# Patient Record
Sex: FEMALE | Race: WHITE | URBAN - METROPOLITAN AREA
[De-identification: names, ages, dates, MRNs, and addresses within clinical notes are randomized per-mention and may not be internally consistent; named-entity substitution may affect disease eponyms.]

---

## 2017-03-21 ENCOUNTER — THERAPY VISIT (OUTPATIENT)
Dept: PHYSICAL THERAPY | Facility: CLINIC | Age: 82
End: 2017-03-21
Payer: COMMERCIAL

## 2017-03-21 DIAGNOSIS — M75.41 IMPINGEMENT SYNDROME, SHOULDER, RIGHT: ICD-10-CM

## 2017-03-21 DIAGNOSIS — M54.50 CHRONIC BILATERAL LOW BACK PAIN WITHOUT SCIATICA: Primary | ICD-10-CM

## 2017-03-21 DIAGNOSIS — G89.29 CHRONIC BILATERAL LOW BACK PAIN WITHOUT SCIATICA: Primary | ICD-10-CM

## 2017-03-21 PROCEDURE — 97161 PT EVAL LOW COMPLEX 20 MIN: CPT | Mod: GP | Performed by: PHYSICAL THERAPIST

## 2017-03-21 PROCEDURE — 97110 THERAPEUTIC EXERCISES: CPT | Mod: GP | Performed by: PHYSICAL THERAPIST

## 2017-03-21 PROCEDURE — 97112 NEUROMUSCULAR REEDUCATION: CPT | Mod: GP | Performed by: PHYSICAL THERAPIST

## 2017-03-21 NOTE — LETTER
"[facility] ATHLETIC [facility] PHYSICAL THERAPY  [address]  [facility] [address]  [phone]    2017    Re: Karyna Beal   :   1932  MRN:  [id number]   REFERRING PHYSICIAN:   Francheska WALTERS [facility] ATHLETIC [facility] PHYSICAL [facility]    Date of Initial Evaluation: 2017  Visits:  Rxs Used: 1  Reason for Referral:     Chronic bilateral low back pain without sciatica  Impingement syndrome, shoulder, right    EVALUATION SUMMARY    [facility] Initial Evaluation    Subjective:  Karyna Beal is a 85 year old female with a right shoulder condition.  This is a chronic condition  3/14/17 saw MD for R shoulder pain that has been off and on for many years, but constant pain is new since 2017.  Chiropractor has treated this sometimes with acupuncture, TENS and neck massage..    Patient reports pain:  Anterior, posterior and upper trap (cap of shoulder).  Radiates to:  Cervical (shoulder blades, sometimes R neck pain).  Pain is described as aching and is intermittent and reported as 5/10.  Associated symptoms:  Loss of strength. Pain is worse during the day.  Symptoms are exacerbated by lying on extremity (vacuuming painful, snow shoveling) and relieved by heat (\"pain no more\").  Since onset symptoms are gradually worsening.  Special testing: none.                Karyna Beal is a 85 year old female with a lumbar condition. This is a chronic condition  3/14/17 saw MD for low back pain that has been chronic for 20+ years, no specific initial injury but does remember significant pain after sitting in airplane for prolonged position.  Symptoms worsening since 2017 for unknown reasons.  Has seen chiropractor in past and currently goes about every 6 weeks (acupuncture, TENs, massage, manipulations etc). Patient reports pain:  Lumbar spine left, central lumbar spine and lumbar spine right (L>R). "  Radiates to:  Gluteals left and gluteals right (lateral hips).  Pain is described as aching and is intermittent and reported as 7/10.  Associated symptoms:  Loss of motion/stiffness. Pain is worse in the A.M..  Symptoms are exacerbated by walking and sitting (has difficulty getting out of bed and straightening up, also difficult/pain getting up from chair, goal for bending for gardening, walking 10 blocks) and relieved by other (hot shower, morning exercises).  Since onset symptoms are gradually worsening.  Special tests:  X-ray (very little space between L3-4-5, arthritis, scoliosis).  Previous treatment includes chiropractic.  There was mild improvement following previous treatment.  General health as reported by patient is good.  Pertinent medical history includes:  Asthma, osteoarthritis and osteoporosis (R   Re: Karyna Beal   :   1932    hand dominant).  Medical allergies: yes (morphine).  Other surgeries include:  Orthopedic surgery and cancer surgery (B TKA, cancer lumpectomy).  Current medications:  Pain medication (tylenol, med for asthma).  Current occupation is Retired.    Barriers include:  Lives alone (home and yard work).  Red flags:  Pain at rest/night.                  Objective:  Standing Alignment:    Cervical/Thoracic:  Forward head  Shoulder/UE:  Rounded shoulders  Lumbar deviations alignment: scoliosis.       Lumbar/SI Evaluation  ROM:    AROM Lumbar:   Flexion:          No loss, hands flat to floor  Ext:                    Min loss   Side Bend:        Left:     Right:   Rotation:           Left:     Right:   Side Glide:        Left:  No loss    Right:  No loss  Functional Tests:  Core strength and proprioception lumbar: B squat mild loss of control with excessive anterior knee excursion.    [facility] Lumbar Evaluation  Posture:  Sitting: fair  Standing: fair  Lordosis: WNL  Lateral Shift: no  Correction of Posture: better  Other Observations: no pain with sit to stand transfer  after performing slouch/over-correct  Test Movements:  FIS: During: no effect  After: no effect  Mechanical Response: no effect  EIS: During: no effect  After: no effect    Repeat EIS: During: decreases  After: better  Mechanical Response: no effect  Conclusion lumbar: full assessment not completed into flexion due to osteoporosis, however after in flexed position extension decreases pain.  Principle of Treatment:  Posture Correction: slouch/overcorrect performed for midrange motion then no pain getting up from sitting. Educate keep neutral spine, use of lumbar roll, educate avoiding flexion to prevent osteoporotic fractures etc  Extension: EIS 10x 3-6x/day or as needed for relief    Assessment/Plan:    Patient is a 85 year old female with lumbar and right side shoulder complaints.    Patient has the following significant findings with corresponding treatment plan.                Diagnosis 1:  Chronic LBP without sciatica  Re: Karyna Beal   :   1932    Pain -  self management, education, directional preference exercise and home program  Decreased ROM/flexibility - manual therapy, therapeutic exercise and home program  Decreased strength - therapeutic exercise, therapeutic activities and home program  Decreased function - therapeutic activities and home program  Impaired posture - neuro re-education and home program  Diagnosis 2:  R shoulder impingement  Will perform objective assessment on subsequent visit due to time constraint    Therapy Evaluation Codes:   1) History comprised of:   Personal factors that impact the plan of care:      Time since onset of symptoms.    Comorbidity factors that impact the plan of care are:      Osteoarthritis, Weakness and osteoporosis.     Medications impacting care: Pain.  2) Examination of Body Systems comprised of:   Body structures and functions that impact the plan of care:      Cervical spine, Lumbar spine and Shoulder.   Activity limitations that impact the plan  of care are:      Bending, Lifting, Sitting, Squatting/kneeling, Standing, Walking and gardening, vacuuming.  3) Clinical presentation characteristics are:   Stable/Uncomplicated.  4) Decision-Making    Low complexity using standardized patient assessment instrument and/or measureable assessment of functional outcome.  Cumulative Therapy Evaluation is: Low complexity.    Previous and current functional limitations:  (See Goal Flow Sheet for this information)    Short term and Long term goals: (See Goal Flow Sheet for this information)     Communication ability:  Patient appears to be able to clearly communicate and understand verbal and written communication and follow directions correctly.  Treatment Explanation - The following has been discussed with the patient:   RX ordered/plan of care  Anticipated outcomes  Possible risks and side effects  This patient would benefit from PT intervention to resume normal activities.   Rehab potential is good.    Frequency:  1 X week, once daily  Duration:  for 6 weeks  Discharge Plan:  Achieve all LTG.  Independent in home treatment program.  Reach maximal therapeutic benefit.        Re: Karyna Beal   :   1932    Thank you for your referral.    INQUIRIES  Therapist: Matilda Mares DPT  [facility]ERAP[facility]  [address] [address]  Phone: [phone]  Fax: [phone]

## 2017-03-21 NOTE — MR AVS SNAPSHOT
"              After Visit Summary   3/21/2017    Karyna Beal    MRN: [id number]           Patient Information     Date Of Birth          1/22/1932        Visit Information        Provider Department      3/21/2017 12:10 PM Matilda Mares PT [facility] Physical Therapy        Today's Diagnoses     Chronic bilateral low back pain without sciatica    -  1    Impingement syndrome, shoulder, right           Follow-ups after your visit        Your next 10 appointments already scheduled     Mar 28, 2017 12:10 PM CDT   [facility] Spine with Matilda Mares PT   [facility]    [address]  [place] [id number] [phone]              Who to contact     If you have questions or need follow up information about today's clinic visit or your schedule please contact [facility] ATHLETIC [facility] PHYSICAL [facility] directly at [phone].  Normal or non-critical lab and imaging results will be communicated to you by [employer], letter or phone within 4 business days after the clinic has received the results. If you do not hear from us within 7 days, please contact the clinic through C[employer] or phone. If you have a critical or abnormal lab result, we will notify you by phone as soon as possible.  Submit refill requests through [employer] or call your pharmacy and they will forward the refill request to us. Please allow 3 business days for your refill to be completed.          Additional Information About Your Visit        [employer] Information     [employer] lets you send messages to your doctor, view your test results, renew your prescriptions, schedule appointments and more. To sign up, go to [email or web address] . Click on \"Log in\" on the left side of the screen, which will take you to the Welcome page. Then click on \"Sign up Now\" on the right side of the page.     You will be " asked to enter the access code listed below, as well as some personal information. Please follow the directions to create your username and password.     Your access code is: [email or web address]  Expires: 2017  3:10 PM     Your access code will  in 90 days. If you need help or a new code, please call your [place] clinic or [phone].        Care EveryWhere ID     This is your Care EveryWhere ID. This could be used by other organizations to access your [facility] medical records  [id number]         Blood Pressure from Last 3 Encounters:   No data found for BP    Weight from Last 3 Encounters:   No data found for Wt              We Performed the Following     HC PT EVAL, LOW COMPLEXITY     SYDNI INITIAL EVAL REPORT     NEUROMUSCULAR RE-EDUCATION     THERAPEUTIC EXERCISES        Primary Care Provider    None Specified       No primary provider on file.        Thank you!     Thank you for choosing [place] FOR ATHLETIC MEDICINE [facility] PHYSICAL THERAPY  for your care. Our goal is always to provide you with excellent care. Hearing back from our patients is one way we can continue to improve our services. Please take a few minutes to complete the written survey that you may receive in the mail after your visit with us. Thank you!             Your Updated Medication List - Protect others around you: Learn how to safely use, store and throw away your medicines at www.disposemymeds.org.      Notice  As of 3/21/2017  3:10 PM    You have not been prescribed any medications.

## 2017-03-28 ENCOUNTER — THERAPY VISIT (OUTPATIENT)
Dept: PHYSICAL THERAPY | Facility: CLINIC | Age: 82
End: 2017-03-28
Payer: COMMERCIAL

## 2017-03-28 DIAGNOSIS — M75.41 IMPINGEMENT SYNDROME, SHOULDER, RIGHT: ICD-10-CM

## 2017-03-28 DIAGNOSIS — M54.50 CHRONIC BILATERAL LOW BACK PAIN WITHOUT SCIATICA: ICD-10-CM

## 2017-03-28 DIAGNOSIS — G89.29 CHRONIC BILATERAL LOW BACK PAIN WITHOUT SCIATICA: ICD-10-CM

## 2017-03-28 PROCEDURE — 97112 NEUROMUSCULAR REEDUCATION: CPT | Mod: GP | Performed by: PHYSICAL THERAPIST

## 2017-03-28 PROCEDURE — 97110 THERAPEUTIC EXERCISES: CPT | Mod: GP | Performed by: PHYSICAL THERAPIST

## 2017-04-11 ENCOUNTER — THERAPY VISIT (OUTPATIENT)
Dept: PHYSICAL THERAPY | Facility: CLINIC | Age: 82
End: 2017-04-11
Payer: COMMERCIAL

## 2017-04-11 DIAGNOSIS — M75.41 IMPINGEMENT SYNDROME, SHOULDER, RIGHT: ICD-10-CM

## 2017-04-11 DIAGNOSIS — G89.29 CHRONIC BILATERAL LOW BACK PAIN WITHOUT SCIATICA: ICD-10-CM

## 2017-04-11 DIAGNOSIS — M54.50 CHRONIC BILATERAL LOW BACK PAIN WITHOUT SCIATICA: ICD-10-CM

## 2017-04-11 PROCEDURE — 97112 NEUROMUSCULAR REEDUCATION: CPT | Mod: GP | Performed by: PHYSICAL THERAPIST

## 2017-04-11 PROCEDURE — 97110 THERAPEUTIC EXERCISES: CPT | Mod: GP | Performed by: PHYSICAL THERAPIST

## 2017-04-11 NOTE — MR AVS SNAPSHOT
"              After Visit Summary   4/11/2017    Karyna Beal    MRN: [id number]           Patient Information     Date Of Birth          1/22/1932        Visit Information        Provider Department      4/11/2017 12:10 PM Matilda Mares PT [facility] Physical Therapy        Today's Diagnoses     Chronic bilateral low back pain without sciatica        Impingement syndrome, shoulder, right           Follow-ups after your visit        Your next 10 appointments already scheduled     Apr 25, 2017 12:10 PM CDT   [facility] Spine with Matilda Mares PT   [facility]    [address] [address] [phone]              Who to contact     If you have questions or need follow up information about today's clinic visit or your schedule please contact [facility] ATHLETIC [facility] PHYSICAL [facility] directly at [phone].  Normal or non-critical lab and imaging results will be communicated to you by [employer], letter or phone within 4 business days after the clinic has received the results. If you do not hear from us within 7 days, please contact the clinic through [employer] or phone. If you have a critical or abnormal lab result, we will notify you by phone as soon as possible.  Submit refill requests through Daojia or call your pharmacy and they will forward the refill request to us. Please allow 3 business days for your refill to be completed.          Additional Information About Your Visit        [employer]ia Information     Daojia lets you send messages to your doctor, view your test results, renew your prescriptions, schedule appointments and more. To sign up, go to [email or web address] . Click on \"Log in\" on the left side of the screen, which will take you to the Welcome page. Then click on \"Sign up Now\" on the right side of the page.     You will be asked " to enter the access code listed below, as well as some personal information. Please follow the directions to create your username and password.     Your access code is: [email or web address]  Expires: 2017  3:10 PM     Your access code will  in 90 days. If you need help or a new code, please call your [place] clinic or [phone].        Care EveryWhere ID     This is your Care EveryWhere ID. This could be used by other organizations to access your [facility] medical records  [id number]         Blood Pressure from Last 3 Encounters:   No data found for BP    Weight from Last 3 Encounters:   No data found for Wt              We Performed the Following     NEUROMUSCULAR RE-EDUCATION     THERAPEUTIC EXERCISES        Primary Care Provider    None Specified       No primary provider on file.        Thank you!     Thank you for choosing [place] FOR ATHLETIC MEDICINE [facility] PHYSICAL THERAPY  for your care. Our goal is always to provide you with excellent care. Hearing back from our patients is one way we can continue to improve our services. Please take a few minutes to complete the written survey that you may receive in the mail after your visit with us. Thank you!             Your Updated Medication List - Protect others around you: Learn how to safely use, store and throw away your medicines at www.disposemymeds.org.      Notice  As of 2017  1:17 PM    You have not been prescribed any medications.

## 2017-08-10 PROBLEM — M75.41 IMPINGEMENT SYNDROME, SHOULDER, RIGHT: Status: RESOLVED | Noted: 2017-03-21 | Resolved: 2017-08-10

## 2017-08-10 PROBLEM — M54.50 CHRONIC BILATERAL LOW BACK PAIN WITHOUT SCIATICA: Status: RESOLVED | Noted: 2017-03-21 | Resolved: 2017-08-10

## 2017-08-10 PROBLEM — G89.29 CHRONIC BILATERAL LOW BACK PAIN WITHOUT SCIATICA: Status: RESOLVED | Noted: 2017-03-21 | Resolved: 2017-08-10

## 2017-08-10 NOTE — PROGRESS NOTES
Subjective:    HPI                    Objective:    System    Physical Exam    General     ROS    Assessment/Plan:      DISCHARGE REPORT    Progress reporting period is from 3/21/17 to 4/11/17, 3 visits.       SUBJECTIVE  Subjective changes noted by patient:  Had chiropractic care, acupuncture and massage yesterday and today is feeling very good.  Did yard work over the weekend and pain was 7/10 after yardwork.  Stretching EIS is does feel good, MARIAM still helpful as well.  Overall is feeling much improved.    Current Pain level: 0/10.     Initial Pain level: 8/10 (8/10 LB, 5/10 shoulder).   Changes in function:  Yes (See Goal flowsheet attached for changes in current functional level)  Adverse reaction to treatment or activity: None    OBJECTIVE  Changes noted in objective findings:  Yes, however the patient has failed to return to therapy so current objective findings are unknown. The objective findings below are from DOS 4/11/17.  AROM R shoulder flexion 148.    MMT hip ABD L 3+/5, R 4-/5, hip ext B 4-/5.    Added 2 new exercises and patient reports that's enough for now.     ASSESSMENT/PLAN  Updated problem list and treatment plan: Diagnosis 1:  Chronic LBP without sciatica   Diagnosis 2:  R shoulder impingement     STG/LTGs have been met or progress has been made towards goals:  Yes (See Goal flow sheet completed today.)  Assessment of Progress: The patient's condition is improving.  The patient's condition has potential to improve.  The patient has not returned to therapy. Current status is unknown.    Recommendations:  This patient is ready to be discharged from therapy and continue their home treatment program as did not return to therapy after 4/11/17.    Please refer to the daily flowsheet for treatment today, total treatment time and time spent performing 1:1 timed codes.

## 2020-07-13 ENCOUNTER — APPOINTMENT (OUTPATIENT)
Age: 85
Setting detail: DERMATOLOGY
End: 2020-07-13

## 2020-07-13 VITALS — RESPIRATION RATE: 16 BRPM | HEIGHT: 63.5 IN | WEIGHT: 135 LBS

## 2020-07-13 DIAGNOSIS — L81.4 OTHER MELANIN HYPERPIGMENTATION: ICD-10-CM

## 2020-07-13 PROCEDURE — OTHER COUNSELING: OTHER

## 2020-07-13 PROCEDURE — 99213 OFFICE O/P EST LOW 20 MIN: CPT

## 2020-07-13 ASSESSMENT — LOCATION SIMPLE DESCRIPTION DERM
LOCATION SIMPLE: LEFT HAND
LOCATION SIMPLE: RIGHT HAND

## 2020-07-13 ASSESSMENT — LOCATION DETAILED DESCRIPTION DERM
LOCATION DETAILED: LEFT ULNAR DORSAL HAND
LOCATION DETAILED: RIGHT RADIAL DORSAL HAND

## 2020-07-13 ASSESSMENT — LOCATION ZONE DERM: LOCATION ZONE: HAND

## 2020-07-20 ENCOUNTER — APPOINTMENT (OUTPATIENT)
Age: 85
Setting detail: DERMATOLOGY
End: 2020-07-20

## 2020-07-20 DIAGNOSIS — Z41.9 ENCOUNTER FOR PROCEDURE FOR PURPOSES OTHER THAN REMEDYING HEALTH STATE, UNSPECIFIED: ICD-10-CM

## 2020-07-20 PROCEDURE — OTHER JUVEDERM VOLLURE XC INJECTION: OTHER

## 2020-07-20 NOTE — PROCEDURE: JUVEDERM VOLLURE XC INJECTION
Procedural Text: - All areas were cleansed with chlorhexidine wipes prior to injection.\\n- Product was aspirated with syringe prior to each injection to reduce risk of intravascular injection.\\n- Patient was instructed to call the clinic immediately should any pain develop other than the expected tenderness at injection sites.\\n- Advised to expect swelling which can take 1-2 weeks to resolve completely. As a result any necessary touch-ups will want to be made no sooner than 2 weeks after injection.

## 2020-07-20 NOTE — HPI: COSMETIC (FILLERS)
Have You Had Fillers Before?: has not had fillers
Additional History: Patient is here for first ever filler appointment. She reports taking blood thinners. Denies lidocaine allergy.

## 2020-07-20 NOTE — PROCEDURE: JUVEDERM VOLLURE XC INJECTION
Consent: - Verbal and written consent obtained after discussion of risks.\\n- Risks include but not limited to bruising, bleeding, asymmetry, infection, allergic reaction, palpation of product, lumps, scar formation, Manish effect, incomplete augmentation, temporary nature, procedural pain, ulceration, and necrosis of skin.

## 2020-07-20 NOTE — PROCEDURE: JUVEDERM VOLLURE XC INJECTION
Post-Care Instructions: - Patient was instructed to apply ice to reduce swelling. Ice should not be appleid directly to the skin.\\n\\n(2 Vollure 1.0cc syringes)\\n\\nPrice: $1500

## 2020-08-03 ENCOUNTER — APPOINTMENT (OUTPATIENT)
Dept: URBAN - METROPOLITAN AREA CLINIC 254 | Age: 85
Setting detail: DERMATOLOGY
End: 2020-08-03

## 2020-08-03 DIAGNOSIS — Z41.9 ENCOUNTER FOR PROCEDURE FOR PURPOSES OTHER THAN REMEDYING HEALTH STATE, UNSPECIFIED: ICD-10-CM

## 2020-08-03 PROCEDURE — OTHER ADDITIONAL NOTES: OTHER

## 2020-08-03 NOTE — PROCEDURE: ADDITIONAL NOTES
Detail Level: Detailed
Additional Notes: Discussed adding a syringe of volbella to be injected superficially in perioral lines and marionette deeper lines. However advised these lines will still be present - we are going to soften these lines. Recommended scheduling 1t minute appointment and coming 45 minutes early to request topical numbing. Patient expressed understanding. Discussed 0.55ml vs 1.0ml syringe. Will likely recommend 0.55ml.

## 2020-08-03 NOTE — HPI: COSMETIC (FILLERS)
Additional History: Had 2 syringes vollure injected 2 weeks ago. She reports that she was happy when things were swollen, and a little disappointed with the marionette lines once the swelling resolved. She is happy with the nasolabial folds.

## 2020-08-24 ENCOUNTER — APPOINTMENT (OUTPATIENT)
Dept: URBAN - METROPOLITAN AREA CLINIC 254 | Age: 85
Setting detail: DERMATOLOGY
End: 2020-08-24

## 2020-08-24 ENCOUNTER — APPOINTMENT (OUTPATIENT)
Dept: URBAN - METROPOLITAN AREA CLINIC 252 | Age: 85
Setting detail: DERMATOLOGY
End: 2020-08-25

## 2020-08-24 ENCOUNTER — RX ONLY (RX ONLY)
Age: 85
End: 2020-08-24

## 2020-08-24 VITALS — RESPIRATION RATE: 15 BRPM | HEIGHT: 69 IN | WEIGHT: 150 LBS

## 2020-08-24 DIAGNOSIS — L08.89 OTHER SPECIFIED LOCAL INFECTIONS OF THE SKIN AND SUBCUTANEOUS TISSUE: ICD-10-CM

## 2020-08-24 DIAGNOSIS — Z41.9 ENCOUNTER FOR PROCEDURE FOR PURPOSES OTHER THAN REMEDYING HEALTH STATE, UNSPECIFIED: ICD-10-CM

## 2020-08-24 PROCEDURE — OTHER PATIENT SPECIFIC COUNSELING: OTHER

## 2020-08-24 PROCEDURE — 99213 OFFICE O/P EST LOW 20 MIN: CPT

## 2020-08-24 PROCEDURE — OTHER PRESCRIPTION: OTHER

## 2020-08-24 PROCEDURE — OTHER JUVEDERM VOLBELLA INJECTION: OTHER

## 2020-08-24 RX ORDER — DOXYCYCLINE 100 MG/1
100MG CAPSULE ORAL BID
Qty: 20 | Refills: 0 | Status: ERX

## 2020-08-24 RX ORDER — DOXYCYCLINE 100 MG/1
100MG CAPSULE ORAL BID
Qty: 20 | Refills: 0 | Status: ERX | COMMUNITY
Start: 2020-08-24

## 2020-08-24 ASSESSMENT — LOCATION ZONE DERM: LOCATION ZONE: LEG

## 2020-08-24 ASSESSMENT — LOCATION DETAILED DESCRIPTION DERM: LOCATION DETAILED: LEFT DISTAL PRETIBIAL REGION

## 2020-08-24 ASSESSMENT — LOCATION SIMPLE DESCRIPTION DERM: LOCATION SIMPLE: LEFT PRETIBIAL REGION

## 2020-08-24 NOTE — PROCEDURE: JUVEDERM VOLBELLA INJECTION
Post-Care Instructions: - Patient was instructed to apply ice to reduce swelling. Ice should not be appleid directly to the skin.\\n\\n(1 Vobella 0.55cc syringe)\\n\\nPrice: $550

## 2020-08-24 NOTE — PROCEDURE: JUVEDERM VOLBELLA INJECTION
Price (Use Numbers Only, No Special Characters Or $): 413 Price (Use Numbers Only, No Special Characters Or $): 417

## 2020-08-24 NOTE — HPI: SKIN LESION
How Severe Is Your Skin Lesion?: mild
Is This A New Presentation, Or A Follow-Up?: Skin Lesion
Additional History: Patient reports skin laceration 6 weeks ago and had a fever last week that has now resolved. Also has been tender this whole time. No calf tenderness. Swelling began in the last week or so. Laceration appears to be healing, but due to tenderness and mild redness, recommended empiric doxycycline. Dvt js not clinically suspected today. However if not resolved with doxycycline then would plan to send to [facility].

## 2020-08-24 NOTE — HPI: COSMETIC (FILLERS)
Additional History: Patient reports she is happy with how her skin looks post vollure several weeks ago.

## 2020-08-24 NOTE — PROCEDURE: PATIENT SPECIFIC COUNSELING
Patient was seen by PCP. This could be and infection, recommend Doxycycline 100mg 1 po BID FOR 10 days. If symptoms do not resolve patient is advised to see PCP for treatment or addition testing.
Detail Level: Zone

## 2020-09-14 ENCOUNTER — APPOINTMENT (OUTPATIENT)
Dept: URBAN - METROPOLITAN AREA CLINIC 254 | Age: 85
Setting detail: DERMATOLOGY
End: 2020-09-15

## 2020-09-14 VITALS — HEIGHT: 63 IN | RESPIRATION RATE: 14 BRPM | WEIGHT: 136 LBS

## 2020-09-14 NOTE — HPI: FOLLOW UP FILLERS
Additional History: She likes the nasolabial folds results. She reports hoping to have a greater reduction in the marionette lines creases. Discussed again realistic expectations. Discussed consultation with plastic surgeon to consider a face lift or partial face life. Also discussed fraxel.

## 2022-11-01 ENCOUNTER — APPOINTMENT (OUTPATIENT)
Dept: URBAN - METROPOLITAN AREA CLINIC 254 | Age: 87
Setting detail: DERMATOLOGY
End: 2022-11-04

## 2022-11-01 VITALS — WEIGHT: 135 LBS | HEIGHT: 60 IN

## 2022-11-01 DIAGNOSIS — D49.2 NEOPLASM OF UNSPECIFIED BEHAVIOR OF BONE, SOFT TISSUE, AND SKIN: ICD-10-CM

## 2022-11-01 DIAGNOSIS — L24.9 IRRITANT CONTACT DERMATITIS, UNSPECIFIED CAUSE: ICD-10-CM

## 2022-11-01 PROCEDURE — OTHER PRESCRIPTION: OTHER

## 2022-11-01 PROCEDURE — OTHER MIPS QUALITY: OTHER

## 2022-11-01 PROCEDURE — OTHER PHOTO-DOCUMENTATION: OTHER

## 2022-11-01 PROCEDURE — OTHER COUNSELING: OTHER

## 2022-11-01 PROCEDURE — OTHER ADDITIONAL NOTES: OTHER

## 2022-11-01 PROCEDURE — 99213 OFFICE O/P EST LOW 20 MIN: CPT

## 2022-11-01 PROCEDURE — OTHER MONITORING: OTHER

## 2022-11-01 RX ORDER — DESONIDE 0.5 MG/G
OINTMENT TOPICAL BID
Qty: 15 | Refills: 1 | Status: ERX | COMMUNITY
Start: 2022-11-01

## 2022-11-01 ASSESSMENT — LOCATION ZONE DERM
LOCATION ZONE: LIP
LOCATION ZONE: HAND

## 2022-11-01 ASSESSMENT — LOCATION SIMPLE DESCRIPTION DERM
LOCATION SIMPLE: LEFT HAND
LOCATION SIMPLE: LEFT LIP
LOCATION SIMPLE: RIGHT LIP

## 2022-11-01 ASSESSMENT — LOCATION DETAILED DESCRIPTION DERM
LOCATION DETAILED: LEFT INFERIOR VERMILION LIP
LOCATION DETAILED: RIGHT SUPERIOR VERMILION LIP
LOCATION DETAILED: LEFT DORSAL MIDDLE FINGER METACARPOPHALANGEAL JOINT

## 2022-11-01 NOTE — HPI: RASH
How Severe Is Your Rash?: moderate
Is This A New Presentation, Or A Follow-Up?: Rash
Additional History: Has tried Aquaphor Benadryl cream Neosporin ointment Oragel.

## 2022-11-01 NOTE — PROCEDURE: MIPS QUALITY
Quality 110: Preventive Care And Screening: Influenza Immunization: Influenza Immunization previously received during influenza season
Quality 111:Pneumonia Vaccination Status For Older Adults: Pneumococcal vaccine (PPSV23) administered on or after patient’s 60th birthday and before the end of the measurement period
Quality 431: Preventive Care And Screening: Unhealthy Alcohol Use - Screening: Patient not identified as an unhealthy alcohol user when screened for unhealthy alcohol use using a systematic screening method
Quality 130: Documentation Of Current Medications In The Medical Record: Current Medications Documented
Quality 402: Tobacco Use And Help With Quitting Among Adolescents: Patient screened for tobacco and never smoked
Detail Level: Detailed

## 2022-11-01 NOTE — PROCEDURE: ADDITIONAL NOTES
Render Risk Assessment In Note?: no
Detail Level: Simple
Additional Notes: -Discussed doing a biopsy to rule out Lentigo malignant in 4 weeks\\n-Patient had no further questions nor concerns

## 2022-11-30 ENCOUNTER — APPOINTMENT (OUTPATIENT)
Dept: URBAN - METROPOLITAN AREA CLINIC 254 | Age: 87
Setting detail: DERMATOLOGY
End: 2022-12-03

## 2022-11-30 VITALS — RESPIRATION RATE: 14 BRPM | HEIGHT: 63 IN | WEIGHT: 135 LBS

## 2022-11-30 DIAGNOSIS — L24.9 IRRITANT CONTACT DERMATITIS, UNSPECIFIED CAUSE: ICD-10-CM

## 2022-11-30 DIAGNOSIS — D49.2 NEOPLASM OF UNSPECIFIED BEHAVIOR OF BONE, SOFT TISSUE, AND SKIN: ICD-10-CM

## 2022-11-30 PROCEDURE — 11102 TANGNTL BX SKIN SINGLE LES: CPT

## 2022-11-30 PROCEDURE — OTHER COUNSELING: OTHER

## 2022-11-30 PROCEDURE — 99212 OFFICE O/P EST SF 10 MIN: CPT | Mod: 25

## 2022-11-30 PROCEDURE — OTHER MIPS QUALITY: OTHER

## 2022-11-30 PROCEDURE — OTHER BIOPSY BY SHAVE METHOD: OTHER

## 2022-11-30 ASSESSMENT — LOCATION ZONE DERM
LOCATION ZONE: HAND
LOCATION ZONE: LIP

## 2022-11-30 ASSESSMENT — LOCATION SIMPLE DESCRIPTION DERM
LOCATION SIMPLE: LEFT LIP
LOCATION SIMPLE: RIGHT LIP
LOCATION SIMPLE: LEFT HAND

## 2022-11-30 ASSESSMENT — LOCATION DETAILED DESCRIPTION DERM
LOCATION DETAILED: LEFT DORSAL MIDDLE FINGER METACARPOPHALANGEAL JOINT
LOCATION DETAILED: LEFT INFERIOR VERMILION LIP
LOCATION DETAILED: RIGHT SUPERIOR VERMILION LIP

## 2022-11-30 NOTE — PROCEDURE: BIOPSY BY SHAVE METHOD
Hide Second Anesthesia?: Yes
Render Path Notes In Note?: No
Cryotherapy Text: The wound bed was treated with cryotherapy after the biopsy was performed.
Detail Level: Detailed
Electrodesiccation Text: The wound bed was treated with electrodesiccation after the biopsy was performed.
Post-Care Instructions: WOUND CARE:\\nDo NOT submerge wound in a bath, swimming pool, or hot tub for at least 1 week or for as long as there is an open wound. Gently cleanse the site daily with mild soap and water. Be careful NOT to allow a forceful stream of water to hit the biopsy site. After cleaning/showering, apply a thin layer of petrolatum ointment or Aquaphor in the wound followed by an adhesive bandage. Continue this process daily until healed. \\n\\nBLEEDING:\\nIf you develop persistent bleeding, apply firm and steady pressure over the dressing with gauze for 15 minutes. If bleeding persists, reapply pressure with an ice pack over the gauze for 15 minutes. NEVER APPLY ICE DIRECTLY TO THE SKIN. Do NOT peek under the gauze during these 15 minutes of pressure.  Call our office at [phone] if you cannot get the bleeding to stop. \\n\\nINFECTION:\\nSigns of infection may include increasing rather than decreasing pain (after the first few days), increasing redness/swelling/heat, draining pus, pink/red streaks around the wound, and fever or chills.  Please call our office immediately at [phone] if infection is suspected. It is normal to have some mild redness on or around the wound edges; this will lighten day by day and will become less tender.\\n\\nPAIN:\\nPain is usually minimal, but if needed you may take acetaminophen (Tylenol) every four hours as needed. Applying an ice pack over the dressing for 15-20 minutes every 2-3 hours will relieve swelling, lessen pain, and help minimize bruising. NEVER APPLY ICE DIRECTLY TO THE SKIN. Avoid ibuprofen (Advil, Motrin) and naproxen (Aleve) for the first 48 hours as these can increase bleeding.\\n\\nSWELLIG AND BRUISING:\\nSwelling and bruising are common and temporary, usually lasting 1 - 2 weeks. It is more common in areas treated around the eyes, hands, and feet. In the days following the procedure, swelling and bruising can be minimized by keeping the affected areas elevated when possible, reducing salty foods, and applying ice packs over the dressing for 15-20 minutes every 2-3 hours. NEVER APPLY ICE DIRECTLY TO THE SKIN.\\n\\nITCHING:\\nItchiness on and around the wound is very common and can last several days to weeks after surgery. Mild itch is normal as the wound is healing. \\n\\nNERVE CHANGES:\\nNumbness is usually temporary, but it may last for several weeks to months. You may also experience sharp pains at the wound sight as it heals.  Mild pain is normal and will gradually improve with time.\\n \\nNO SMOKING:\\nSmoking will delay the healing process. If you smoke, we recommend trying to quit or at minimum reduce how much you smoke following a procedure.
Biopsy Type: H and E
Wound Care: Petrolatum
Size Of Lesion In Cm: 0
Electrodesiccation And Curettage Text: The wound bed was treated with electrodesiccation and curettage after the biopsy was performed.
Consent: - Verbal and written consent was obtained and risks were reviewed prior to procedure today. \\n- Risks discussed include but are not limited to scarring, infection, bleeding, scabbing, incomplete removal, nerve damage, pain, and allergy to anesthesia.
Path Notes Override (Will Replace All Of The Above Text): NROD
Notification Instructions: - It can take up to 2 weeks to get a biopsy result. \\n- Please refrain from calling to request results until after 2 weeks.
Anesthesia Type: 2% lidocaine with epinephrine
Biopsy Method: Dermablade
Billing Type: Third-Party Bill
Anesthesia Volume In Cc (Will Not Render If 0): 0.5
Silver Nitrate Text: The wound bed was treated with silver nitrate after the biopsy was performed.
Depth Of Biopsy: dermis
Dressing: bandage
Curettage Text: The wound bed was treated with curettage after the biopsy was performed.
Hemostasis: Aluminum Chloride
Type Of Destruction Used: Electrodesiccation
Information: Selecting Yes will display possible errors in your note based on the variables you have selected. This validation is only offered as a suggestion for you. PLEASE NOTE THAT THE VALIDATION TEXT WILL BE REMOVED WHEN YOU FINALIZE YOUR NOTE. IF YOU WANT TO FAX A PRELIMINARY NOTE YOU WILL NEED TO TOGGLE THIS TO 'NO' IF YOU DO NOT WANT IT IN YOUR FAXED NOTE.

## 2022-11-30 NOTE — PROCEDURE: COUNSELING
Detail Level: Detailed
Patient Specific Counseling (Will Not Stick From Patient To Patient): - Discussed that this nevus has atypical features that warrant a biopsy.\\n- Patient expressed understanding.\\n- Follow-up for re-examination for regimentation or an additional removal procedure may become necessary depending the pathologist's report.
Patient Specific Counseling (Will Not Stick From Patient To Patient): - Resolved with desonide 0.05 % topical ointment BID x 5 days\\n- Continue applying desonide to the lips if rash reoccurs.

## 2024-02-29 ENCOUNTER — APPOINTMENT (OUTPATIENT)
Dept: URBAN - METROPOLITAN AREA CLINIC 254 | Age: 89
Setting detail: DERMATOLOGY
End: 2024-03-02

## 2024-02-29 VITALS — WEIGHT: 132 LBS | HEIGHT: 65 IN

## 2024-02-29 DIAGNOSIS — L21.8 OTHER SEBORRHEIC DERMATITIS: ICD-10-CM

## 2024-02-29 DIAGNOSIS — L82.0 INFLAMED SEBORRHEIC KERATOSIS: ICD-10-CM

## 2024-02-29 PROCEDURE — OTHER MIPS QUALITY: OTHER

## 2024-02-29 PROCEDURE — 99213 OFFICE O/P EST LOW 20 MIN: CPT | Mod: 25

## 2024-02-29 PROCEDURE — OTHER ADDITIONAL NOTES: OTHER

## 2024-02-29 PROCEDURE — OTHER LIQUID NITROGEN: OTHER

## 2024-02-29 PROCEDURE — 17110 DESTRUCT B9 LESION 1-14: CPT

## 2024-02-29 PROCEDURE — OTHER COUNSELING: OTHER

## 2024-02-29 ASSESSMENT — LOCATION ZONE DERM: LOCATION ZONE: FACE

## 2024-02-29 ASSESSMENT — LOCATION DETAILED DESCRIPTION DERM: LOCATION DETAILED: RIGHT LATERAL MALAR CHEEK

## 2024-02-29 ASSESSMENT — LOCATION SIMPLE DESCRIPTION DERM: LOCATION SIMPLE: RIGHT CHEEK

## 2024-02-29 NOTE — HPI: SKIN LESIONS
How Severe Is Your Skin Lesion?: mild
Have Your Skin Lesions Been Treated?: not been treated
Is This A New Presentation, Or A Follow-Up?: Skin Lesion
Additional History: Patient presents with concerning lesion on right cheek.  Has treated with OTC hydrocortisone.

## 2024-02-29 NOTE — PROCEDURE: COUNSELING
Detail Level: Detailed
Patient Specific Counseling (Will Not Stick From Patient To Patient): - Seborrheic keratoses are benign growths.\\n- Patients get more of them as they age, and these may grow slowly over time.\\n- Some seborrheic keratoses (particularly larger lesions) require multiple treatments.\\n- Return to clinic should any treated growths not be resolved within 4 weeks.
Shampoo Recommendations: OTC Head and Shoulders, Selsun Blue, or Nizoral 1% shampoo\\nalso consider Salicylic acid or Tea Tree oil shampoos to eliminate fine scale.
Topical Steroid Recommendations: For face, recommendation to use prescribed topical steroid twice daily to affected areas until resolved or for no longer than one week per month. Use sparingly. \\nFor Scalp, recommendation to use prescribed topical steroid solution once nightly until resolved or for no longer than two weeks per month. For maintenance, use topical steroid solution once per week.
Detail Level: Zone
Topical Antifungal Recommendations: If rash is on the face, recommendation of using ketoconazole 2% cream three times a week to full face for maintenance.\\nUse ketoconazole shampoo daily to wash scalp until clear then reduce use to once a week for maintenance.

## 2024-02-29 NOTE — PROCEDURE: ADDITIONAL NOTES
Additional Notes: -reassured pt condition is benign and can be treated by means of hydrocortisone. Patient expressed understanding.
Detail Level: Simple
Render Risk Assessment In Note?: no

## 2024-02-29 NOTE — PROCEDURE: LIQUID NITROGEN
Add 52 Modifier (Optional): no
Post-Care Instructions: - Avoid picking at any of the treated lesions.\\n- Blisters should not be popped. However should a blister rupture, cover it with Vaseline ointment or Aquaphor and a bandage until healed.
Medical Necessity Information: It is in your best interest to select a reason for this procedure from the list below. All of these items fulfill various CMS LCD requirements except the new and changing color options.
Application Tool (Optional): Liquid Nitrogen Sprayer
Detail Level: Detailed
Show Aperture Variable?: Yes
Consent: - Consent was obtained and risks were reviewed prior to procedure today. All questions were answered prior to procedure today.\\n- Risks discussed include but are not limited to pain, crusting, scabbing, blistering, scarring, temporary or permanent darker or lighter pigmentary change, recurrence, incomplete resolution, and infection.
Medical Necessity Clause: This procedure was medically necessary because the lesions that were treated were:
Spray Paint Text: The liquid nitrogen was applied to the skin utilizing a spray paint frosting technique.